# Patient Record
Sex: MALE | Race: WHITE | HISPANIC OR LATINO | URBAN - METROPOLITAN AREA
[De-identification: names, ages, dates, MRNs, and addresses within clinical notes are randomized per-mention and may not be internally consistent; named-entity substitution may affect disease eponyms.]

---

## 2021-12-15 NOTE — PATIENT DISCUSSION
Continue artificial tears. Recommend using Erase Your Face make up cloths to remove makeup. Can be purchased on [address].

## 2023-01-04 NOTE — PATIENT DISCUSSION
Continue artificial tears. Recommend using Erase Your Face make up cloths to remove makeup. Can be purchased on [employer] [address].

## 2023-04-24 ENCOUNTER — NEW PATIENT (OUTPATIENT)
Dept: URBAN - METROPOLITAN AREA CLINIC 24 | Facility: CLINIC | Age: 23
End: 2023-04-24

## 2023-04-24 DIAGNOSIS — H53.8: ICD-10-CM

## 2023-04-24 PROCEDURE — 92004 COMPRE OPH EXAM NEW PT 1/>: CPT

## 2023-04-24 ASSESSMENT — VISUAL ACUITY
OS_CC: 20/20
OU_SC: 20/20"16IN
OU_SC: 20/100
OU_CC: 20/20
OS_SC: 20/250
OD_SC: 20/150
OD_CC: 20/20

## 2023-04-24 ASSESSMENT — KERATOMETRY
OS_AXISANGLE_DEGREES: 0
OS_K1POWER_DIOPTERS: 42.00
OD_K1POWER_DIOPTERS: 41.75
OD_K2POWER_DIOPTERS: 41.75
OS_K2POWER_DIOPTERS: 42.00
OD_AXISANGLE_DEGREES: 0
OD_AXISANGLE2_DEGREES: 90
OS_AXISANGLE2_DEGREES: 90

## 2023-04-24 ASSESSMENT — TONOMETRY
OD_IOP_MMHG: 16
OS_IOP_MMHG: 16